# Patient Record
Sex: MALE | Race: WHITE | NOT HISPANIC OR LATINO | Employment: FULL TIME | URBAN - METROPOLITAN AREA
[De-identification: names, ages, dates, MRNs, and addresses within clinical notes are randomized per-mention and may not be internally consistent; named-entity substitution may affect disease eponyms.]

---

## 2021-05-20 ENCOUNTER — ANESTHESIA - HEALTHEAST (OUTPATIENT)
Dept: SURGERY | Facility: HOSPITAL | Age: 46
End: 2021-05-20

## 2021-05-20 ENCOUNTER — RECORDS - HEALTHEAST (OUTPATIENT)
Dept: ADMINISTRATIVE | Facility: OTHER | Age: 46
End: 2021-05-20

## 2021-05-20 ENCOUNTER — COMMUNICATION - HEALTHEAST (OUTPATIENT)
Dept: SCHEDULING | Facility: CLINIC | Age: 46
End: 2021-05-20

## 2021-05-20 ENCOUNTER — HOSPITAL ENCOUNTER (EMERGENCY)
Dept: SURGERY | Facility: HOSPITAL | Age: 46
Discharge: HOME | End: 2021-05-20
Attending: FAMILY MEDICINE | Admitting: SPECIALIST

## 2021-05-20 ENCOUNTER — SURGERY - HEALTHEAST (OUTPATIENT)
Dept: SURGERY | Facility: HOSPITAL | Age: 46
End: 2021-05-20

## 2021-05-20 DIAGNOSIS — K35.30 ACUTE APPENDICITIS WITH LOCALIZED PERITONITIS, WITHOUT PERFORATION, ABSCESS, OR GANGRENE: ICD-10-CM

## 2021-05-20 LAB
ALBUMIN SERPL-MCNC: 4.3 G/DL (ref 3.5–5)
ALP SERPL-CCNC: 101 U/L (ref 45–120)
ALT SERPL W P-5'-P-CCNC: 20 U/L (ref 0–45)
ANION GAP SERPL CALCULATED.3IONS-SCNC: 8 MMOL/L (ref 5–18)
AST SERPL W P-5'-P-CCNC: 20 U/L (ref 0–40)
BASOPHILS # BLD AUTO: 0.1 THOU/UL (ref 0–0.2)
BASOPHILS NFR BLD AUTO: 1 % (ref 0–2)
BILIRUB DIRECT SERPL-MCNC: 0.4 MG/DL
BILIRUB SERPL-MCNC: 1.2 MG/DL (ref 0–1)
BUN SERPL-MCNC: 17 MG/DL (ref 8–22)
CALCIUM SERPL-MCNC: 8.7 MG/DL (ref 8.5–10.5)
CHLORIDE BLD-SCNC: 105 MMOL/L (ref 98–107)
CO2 SERPL-SCNC: 27 MMOL/L (ref 22–31)
CREAT SERPL-MCNC: 1.45 MG/DL (ref 0.7–1.3)
EOSINOPHIL # BLD AUTO: 0.2 THOU/UL (ref 0–0.4)
EOSINOPHIL NFR BLD AUTO: 2 % (ref 0–6)
ERYTHROCYTE [DISTWIDTH] IN BLOOD BY AUTOMATED COUNT: 11.7 % (ref 11–14.5)
GFR SERPL CREATININE-BSD FRML MDRD: 53 ML/MIN/1.73M2
GLUCOSE BLD-MCNC: 116 MG/DL (ref 70–125)
HCT VFR BLD AUTO: 45.1 % (ref 40–54)
HGB BLD-MCNC: 15.2 G/DL (ref 14–18)
IMM GRANULOCYTES # BLD: 0 THOU/UL
IMM GRANULOCYTES NFR BLD: 0 %
LIPASE SERPL-CCNC: 25 U/L (ref 0–52)
LYMPHOCYTES # BLD AUTO: 1.7 THOU/UL (ref 0.8–4.4)
LYMPHOCYTES NFR BLD AUTO: 19 % (ref 20–40)
MCH RBC QN AUTO: 30.7 PG (ref 27–34)
MCHC RBC AUTO-ENTMCNC: 33.7 G/DL (ref 32–36)
MCV RBC AUTO: 91 FL (ref 80–100)
MONOCYTES # BLD AUTO: 0.8 THOU/UL (ref 0–0.9)
MONOCYTES NFR BLD AUTO: 9 % (ref 2–10)
NEUTROPHILS # BLD AUTO: 6.1 THOU/UL (ref 2–7.7)
NEUTROPHILS NFR BLD AUTO: 69 % (ref 50–70)
PLATELET # BLD AUTO: 251 THOU/UL (ref 140–440)
PMV BLD AUTO: 8.9 FL (ref 8.5–12.5)
POTASSIUM BLD-SCNC: 4 MMOL/L (ref 3.5–5)
PROT SERPL-MCNC: 7.6 G/DL (ref 6–8)
RBC # BLD AUTO: 4.95 MILL/UL (ref 4.4–6.2)
SARS-COV-2 PCR RESULT-HE - HISTORICAL: NEGATIVE
SODIUM SERPL-SCNC: 140 MMOL/L (ref 136–145)
WBC: 8.8 THOU/UL (ref 4–11)

## 2021-05-20 ASSESSMENT — MIFFLIN-ST. JEOR
SCORE: 2012.53
SCORE: 2017.53

## 2021-05-21 LAB
LAB AP CHARGES (HE HISTORICAL CONVERSION): NORMAL
PATH REPORT.COMMENTS IMP SPEC: NORMAL
PATH REPORT.FINAL DX SPEC: NORMAL
PATH REPORT.GROSS SPEC: NORMAL
PATH REPORT.MICROSCOPIC SPEC OTHER STN: NORMAL
PATH REPORT.RELEVANT HX SPEC: NORMAL
RESULT FLAG (HE HISTORICAL CONVERSION): NORMAL

## 2021-05-27 VITALS
BODY MASS INDEX: 27.67 KG/M2 | HEIGHT: 76 IN | WEIGHT: 227.3 LBS | HEIGHT: 76 IN | WEIGHT: 227.3 LBS | BODY MASS INDEX: 27.68 KG/M2

## 2021-05-31 ENCOUNTER — RECORDS - HEALTHEAST (OUTPATIENT)
Dept: ADMINISTRATIVE | Facility: CLINIC | Age: 46
End: 2021-05-31

## 2021-06-01 ENCOUNTER — RECORDS - HEALTHEAST (OUTPATIENT)
Dept: ADMINISTRATIVE | Facility: CLINIC | Age: 46
End: 2021-06-01

## 2021-06-17 NOTE — ED PROVIDER NOTES
EMERGENCY DEPARTMENT ENCOUNTER      NAME: Rene Rowe Jr.  AGE: 45 y.o. male  YOB: 1975  MRN: [id number]  EVALUATION DATE & TIME: 2021  2:14 AM    ED PROVIDER: Jose Zamorano M.D.    FINAL IMPRESSION:  1. Acute appendicitis with localized peritonitis, without perforation, abscess, or gangrene        ED COURSE & MEDICAL DECISION MAKIN:50 AM Patient seen and examined. Prior history and records reviewed.  I was wearing PPE including gloves and N95 mask.  Differential diagnosis includes but not limited to gastritis, cholecystitis, pancreatitis, colitis, enteritis, diverticulitis, urinary tract infection, myocardial infarction, pneumonia appendicitis, AAA, cholelithiasis, ischemic bowel.  Patient presents with right lower quadrant pain.  Labs done prior to evaluation are normal, CT scan done prior to evaluation demonstrates acute appendicitis.  Discussed plan with patient, Zosyn ordered and likely surgery in the morning.  5:04 AM care discussed with Dr. Oliva, plan for surgery today.  6:00 AM Signout to Dr. Steele.    At the conclusion of the encounter I discussed the results of all of the tests and the disposition. The questions were answered. The patient or family acknowledged understanding and was agreeable with the care plan.       MEDICATIONS GIVEN IN THE EMERGENCY:  Medications   sodium chloride 0.9% 1,000 mL (1,000 mL Intravenous New Bag 21 0309)   iopamidol solution 75 mL (ISOVUE-370) (75 mL Intravenous Given 21 0235)   piperacillin-tazobactam 3.375 g in NaCl 0.9 % 50 mL (MINI-BAG Plus) (ZOSYN) (0 g Intravenous Stopped 21 0341)       NEW PRESCRIPTIONS STARTED AT TODAY'S ER VISIT  There are no discharge medications for this patient.       PCP: Tyler Sidhu MD  =================================================================    Chief Complaint   Patient presents with     Abdominal Pain     Nausea       HPI      Rene Rowe Jr. is a 45 y.o. male  "with a PMH of chronic renal insufficiency (stage 2) and kidney donor who presents to this ED for evaluation of abdominal pain and nausea.    Patient reports \"a lot of pain\" in the right lower abdomen and nausea. He states the pain is worse with movement, but somewhat improves if he finds a comfortable spot and remains still. Patient reports his pain began Tuesday (05/18) and was just above his belly button, but yesterday (05/19) the pain moved to the right lower abdomen. Patient additionally reports some constipation and states he has only had 1 bowel movement in the pat 2 days. He also states that he has been urinating without difficulty, but occasionally is unsure if he was emptied his bladder fully. Patient's prior surgeries include when he donated his kidney, vasectomy, and hernia repair. Of note, the patient last drank at 1845 and last ate at 2100 last night. Patient denies any other symptoms or concerns at this time.     REVIEW OF SYSTEMS   Review of Systems   Constitutional: Negative for chills and fever.   HENT: Negative for congestion and sore throat.    Respiratory: Negative for cough and shortness of breath.    Cardiovascular: Negative for chest pain.   Gastrointestinal: Positive for abdominal pain (RLQ), constipation and nausea. Negative for diarrhea and vomiting.   Genitourinary: Negative for difficulty urinating, dysuria, frequency and hematuria.    See HPI.  All other systems reviewed and negative.    PAST MEDICAL HISTORY:  History reviewed. No pertinent past medical history.    PAST SURGICAL HISTORY:  Relevant past surgical history reviewed with patient, unless otherwise stated in HPI, history not pertinent to this visit.    CURRENT MEDICATIONS:    No current facility-administered medications on file prior to encounter.      No current outpatient medications on file prior to encounter.       ALLERGIES:  No Known Allergies    FAMILY HISTORY:  History reviewed. No pertinent family history.    SOCIAL " "HISTORY:   Social History     Socioeconomic History     Marital status:      Spouse name: None     Number of children: None     Years of education: None     Highest education level: None   Occupational History     None   Social Needs     Financial resource strain: None     Food insecurity     Worry: None     Inability: None     Transportation needs     Medical: None     Non-medical: None   Tobacco Use     Smoking status: None   Substance and Sexual Activity     Alcohol use: None     Drug use: None     Sexual activity: None   Lifestyle     Physical activity     Days per week: None     Minutes per session: None     Stress: None   Relationships     Social connections     Talks on phone: None     Gets together: None     Attends Congregation service: None     Active member of club or organization: None     Attends meetings of clubs or organizations: None     Relationship status: None     Intimate partner violence     Fear of current or ex partner: None     Emotionally abused: None     Physically abused: None     Forced sexual activity: None   Other Topics Concern     None   Social History Narrative     None       VITALS:  Patient Vitals for the past 24 hrs:   BP Temp Pulse Resp SpO2 Height Weight   05/20/21 0126 112/80 97.8  F (36.6  C) 70 16 97 % 6' 4\" (1.93 m) (!) 227 lb 4.8 oz (103.1 kg)       PHYSICAL EXAM    Physical Exam   Constitutional: He is oriented to person, place, and time. He appears well-developed and well-nourished.   HENT:   Head: Normocephalic and atraumatic.   Nose: Nose normal.   Mouth/Throat: Oropharynx is clear and moist.   Eyes: Pupils are equal, round, and reactive to light. Conjunctivae and EOM are normal.   Neck: Normal range of motion. Neck supple.   Cardiovascular: Normal rate, regular rhythm and normal heart sounds.   Pulmonary/Chest: Effort normal and breath sounds normal.   Abdominal: Soft. Bowel sounds are normal. There is abdominal tenderness (RLQ). There is no rebound and no " guarding.   Musculoskeletal: Normal range of motion.   Lymphadenopathy:     He has no cervical adenopathy.   Neurological: He is alert and oriented to person, place, and time. Coordination normal.   No gross focal neurologic deficits.  Cranial nerves III-XII intact.   Skin: Skin is warm and dry.   Psychiatric: He has a normal mood and affect. His behavior is normal.   Nursing note and vitals reviewed.       LAB:  All pertinent labs reviewed and interpreted.  Results for orders placed or performed during the hospital encounter of 05/20/21   Basic Metabolic Panel   Result Value Ref Range    Sodium 140 136 - 145 mmol/L    Potassium 4.0 3.5 - 5.0 mmol/L    Chloride 105 98 - 107 mmol/L    CO2 27 22 - 31 mmol/L    Anion Gap, Calculation 8 5 - 18 mmol/L    Glucose 116 70 - 125 mg/dL    Calcium 8.7 8.5 - 10.5 mg/dL    BUN 17 8 - 22 mg/dL    Creatinine 1.45 (H) 0.70 - 1.30 mg/dL    GFR MDRD Af Amer >60 >60 mL/min/1.73m2    GFR MDRD Non Af Amer 53 (L) >60 mL/min/1.73m2   Hepatic Profile   Result Value Ref Range    Bilirubin, Total 1.2 (H) 0.0 - 1.0 mg/dL    Bilirubin, Direct 0.4 <=0.5 mg/dL    Protein, Total 7.6 6.0 - 8.0 g/dL    Albumin 4.3 3.5 - 5.0 g/dL    Alkaline Phosphatase 101 45 - 120 U/L    AST 20 0 - 40 U/L    ALT 20 0 - 45 U/L   Lipase   Result Value Ref Range    Lipase 25 0 - 52 U/L   HM1 (CBC with Diff)   Result Value Ref Range    WBC 8.8 4.0 - 11.0 thou/uL    RBC 4.95 4.40 - 6.20 mill/uL    Hemoglobin 15.2 14.0 - 18.0 g/dL    Hematocrit 45.1 40.0 - 54.0 %    MCV 91 80 - 100 fL    MCH 30.7 27.0 - 34.0 pg    MCHC 33.7 32.0 - 36.0 g/dL    RDW 11.7 11.0 - 14.5 %    Platelets 251 140 - 440 thou/uL    MPV 8.9 8.5 - 12.5 fL    Neutrophils % 69 50 - 70 %    Lymphocytes % 19 (L) 20 - 40 %    Monocytes % 9 2 - 10 %    Eosinophils % 2 0 - 6 %    Basophils % 1 0 - 2 %    Immature Granulocyte % 0 <=0 %    Neutrophils Absolute 6.1 2.0 - 7.7 thou/uL    Lymphocytes Absolute 1.7 0.8 - 4.4 thou/uL    Monocytes Absolute 0.8 0.0 -  0.9 thou/uL    Eosinophils Absolute 0.2 0.0 - 0.4 thou/uL    Basophils Absolute 0.1 0.0 - 0.2 thou/uL    Immature Granulocyte Absolute 0.0 <=0.0 thou/uL   Asymptomatic SARS-CoV-2 (COVID-19)-PCR    Specimen: Respiratory   Result Value Ref Range    SARS-CoV-2 PCR Result Negative Negative, Invalid       RADIOLOGY:  Reviewed all pertinent imaging. Please see official radiology report.  Ct Abdomen Pelvis Without Oral With Iv Contrast    Result Date: 5/20/2021  EXAM: CT ABDOMEN PELVIS WO ORAL W IV CONTRAST LOCATION: [facility] DATE/TIME: 5/20/2021 2:41 AM INDICATION: Right sided abdominal pain. COMPARISON: None. TECHNIQUE: CT scan of the abdomen and pelvis was performed following injection of IV contrast. Multiplanar reformats were obtained. Dose reduction techniques were used. CONTRAST: Iopamidol (Isovue-370) 75 mL. FINDINGS: LOWER CHEST: Normal. HEPATOBILIARY: Normal. PANCREAS: Normal. SPLEEN: Normal. ADRENAL GLANDS: Normal. KIDNEYS/BLADDER: Status post left nephrectomy. 3.3 x 3 cm benign-appearing right ovarian cyst, no follow-up required. No obstructing renal or ureteral stones. No hydroureteronephrosis. BOWEL: 1.4 cm dilated distended appendix with adjacent inflammatory change and mural thickening of the cecum at the appendiceal origin consistent with appendicitis. Small volume free fluid. No organized fluid collection. Scattered diverticulosis. LYMPH NODES: Normal. VASCULATURE: Unremarkable. PELVIC ORGANS: Normal. MUSCULOSKELETAL: Normal.     1.  Acute appendicitis. Small volume free fluid. No organized fluid collection. 2.  Status post left nephrectomy.    I, Natasha Polanco, am serving as a scribe to document services personally performed by Dr. Jose Zamorano based on my observation and the provider's statements to me. I, Jose Zamorano MD attest that Natasha Polanco is acting in a scribe capacity, has observed my performance of the services and has documented them in  accordance with my direction.    Jose Zamorano M.D.  Emergency Medicine  [facility] EMERGENCY DEPARTMENT  [address]  [facility] [address]  Dept: [phone]  Loc: [phone]       Jose Zamorano MD  05/20/21 0538

## 2021-06-17 NOTE — PROGRESS NOTES
Pharmacy Note - Admission Medication History  Pertinent Provider Information:    ______________________________________________________________________  Prior To Admission (PTA) med list completed and updated in EMR.     No outpatient medications have been marked as taking for the 5/20/21 encounter (Hospital Encounter).       Information source(s): [facility] and CareEverywhere[facility]  Method of interview communication: in-person  Patient was asked about OTC/herbal products specifically.  PTA med list reflects this.  Based on the pharmacist s assessment, the PTA med list information appears reliable  Allergies were reviewed, assessed, and updated with the patient.    Patient does not use any multi-dose medications prior to admission.   Thank you for the opportunity to participate in the care of this patient.    Mary Tapia, PharmD     5/20/2021     7:32 AM

## 2021-06-17 NOTE — CONSULTS
Consultation - Surgery  Rene Mclean Jr.,  1975, MRN [id number]    Admitting Dx: Acute appendicitis with localized peritonitis, without perforation, abscess, or gangrene [K35.30]    PCP: Tyler Sidhu MD, [phone]   Code status:  No Order       Extended Emergency Contact Information  Primary Emergency Contact: ARLENE MCLEAN  Home Phone: [phone]  Mobile Phone: [phone]  Relation: Spouse       Assessment and Plan     Principal Problem:    Acute appendicitis with localized peritonitis, without perforation, abscess, or gangrene  N.p.o.  IV antibiotics  OR for laparoscopic appendectomy     Chief Complaint Acute appendicitis with localized peritonitis, without perforation, abscess, or gangrene       HPI    We have been requested by emergency room to evaluate Rene Mclean Jr. for abdominal pain who is a 45 y.o. year old male for appendicitis.  Patient presents with abdominal pain going on for a few days time.  Got really bad yesterday count of mid abdomen pain but now localized in the right lower abdomen.  Was worked up in the emergency room and found to have appendicitis.  Admitted IV antibiotics and for appendectomy.  Otherwise normal state of health       Medical History  There are no active non-hospital problems to display for this patient.    History reviewed. No pertinent past medical history. Surgical History  He  has a past surgical history that includes Nephrectomy living donor (Left).   Social History  Reviewed, and he  reports that he has never smoked. He has never used smokeless tobacco. He reports current alcohol use. He reports that he does not use drugs.   Allergies  No Known Allergies Family History  Reviewed, and family history is not on file.   Psychosocial Needs  Social History     Social History Narrative     Not on file     Additional psychosocial needs reviewed per nursing assessment.     Prior to Admission Medications   No medications prior to admission.           Review of Systems:  Pertinent items are noted in HPI.  A 12 point comprehensive review of systems was negative except as noted. Physical Exam:  Temp:  [97.8  F (36.6  C)-98.7  F (37.1  C)] 98.7  F (37.1  C)  Heart Rate:  [49-70] 56  Resp:  [16-18] 18  BP: (104-119)/(59-80) 108/72    General appearance: alert, appears stated age and cooperative  Head: Normocephalic, without obvious abnormality, atraumatic  Eyes: conjunctivae/corneas clear. PERRL, EOM's intact. Fundi benign.  Nose: Nares normal. Septum midline. Mucosa normal. No drainage or sinus tenderness.  Neck: supple, symmetrical, trachea midline  Lungs: clear to auscultation bilaterally  Heart: regular rate and rhythm, S1, S2 normal, no murmur, click, rub or gallop  Abdomen: Soft tender right lower quadrant no rebound  Extremities: extremities normal, atraumatic, no cyanosis or edema  Pulses: 2+ and symmetric  Neurologic: Grossly normal       Pertinent Labs  Lab Results: personally reviewed.   Recent Results (from the past 24 hour(s))   Basic Metabolic Panel   Result Value Ref Range    Sodium 140 136 - 145 mmol/L    Potassium 4.0 3.5 - 5.0 mmol/L    Chloride 105 98 - 107 mmol/L    CO2 27 22 - 31 mmol/L    Anion Gap, Calculation 8 5 - 18 mmol/L    Glucose 116 70 - 125 mg/dL    Calcium 8.7 8.5 - 10.5 mg/dL    BUN 17 8 - 22 mg/dL    Creatinine 1.45 (H) 0.70 - 1.30 mg/dL    GFR MDRD Af Amer >60 >60 mL/min/1.73m2    GFR MDRD Non Af Amer 53 (L) >60 mL/min/1.73m2   Hepatic Profile   Result Value Ref Range    Bilirubin, Total 1.2 (H) 0.0 - 1.0 mg/dL    Bilirubin, Direct 0.4 <=0.5 mg/dL    Protein, Total 7.6 6.0 - 8.0 g/dL    Albumin 4.3 3.5 - 5.0 g/dL    Alkaline Phosphatase 101 45 - 120 U/L    AST 20 0 - 40 U/L    ALT 20 0 - 45 U/L   Lipase   Result Value Ref Range    Lipase 25 0 - 52 U/L   HM1 (CBC with Diff)   Result Value Ref Range    WBC 8.8 4.0 - 11.0 thou/uL    RBC 4.95 4.40 - 6.20 mill/uL    Hemoglobin 15.2 14.0 - 18.0 g/dL    Hematocrit 45.1 40.0 -  54.0 %    MCV 91 80 - 100 fL    MCH 30.7 27.0 - 34.0 pg    MCHC 33.7 32.0 - 36.0 g/dL    RDW 11.7 11.0 - 14.5 %    Platelets 251 140 - 440 thou/uL    MPV 8.9 8.5 - 12.5 fL    Neutrophils % 69 50 - 70 %    Lymphocytes % 19 (L) 20 - 40 %    Monocytes % 9 2 - 10 %    Eosinophils % 2 0 - 6 %    Basophils % 1 0 - 2 %    Immature Granulocyte % 0 <=0 %    Neutrophils Absolute 6.1 2.0 - 7.7 thou/uL    Lymphocytes Absolute 1.7 0.8 - 4.4 thou/uL    Monocytes Absolute 0.8 0.0 - 0.9 thou/uL    Eosinophils Absolute 0.2 0.0 - 0.4 thou/uL    Basophils Absolute 0.1 0.0 - 0.2 thou/uL    Immature Granulocyte Absolute 0.0 <=0.0 thou/uL   Asymptomatic SARS-CoV-2 (COVID-19)-PCR    Specimen: Respiratory   Result Value Ref Range    SARS-CoV-2 PCR Result Negative Negative, Invalid           Pertinent Radiology  Radiology Results: Personally reviewed image/s     CT Abdomen Pelvis Without Oral With IV Contrast [[id number]] Collected: 05/20/21 0241   Order Status: Completed Updated: 05/20/21 0300   Narrative:     EXAM: CT ABDOMEN PELVIS WO ORAL W IV CONTRAST   LOCATION: [facility]   DATE/TIME: 5/20/2021 2:41 AM     INDICATION: Right sided abdominal pain.   COMPARISON: None.   TECHNIQUE: CT scan of the abdomen and pelvis was performed following injection of IV contrast. Multiplanar reformats were obtained. Dose reduction techniques were used.   CONTRAST: Iopamidol (Isovue-370) 75 mL.     FINDINGS:   LOWER CHEST: Normal.     HEPATOBILIARY: Normal.     PANCREAS: Normal.     SPLEEN: Normal.     ADRENAL GLANDS: Normal.     KIDNEYS/BLADDER: Status post left nephrectomy. 3.3 x 3 cm benign-appearing right ovarian cyst, no follow-up required. No obstructing renal or ureteral stones. No hydroureteronephrosis.     BOWEL: 1.4 cm dilated distended appendix with adjacent inflammatory change and mural thickening of the cecum at the appendiceal origin consistent with appendicitis. Small volume free fluid. No organized fluid  collection. Scattered diverticulosis.     LYMPH NODES: Normal.     VASCULATURE: Unremarkable.     PELVIC ORGANS: Normal.     MUSCULOSKELETAL: Normal.    Impression:     1.  Acute appendicitis. Small volume free fluid. No organized fluid collection.   2.  Status post left nephrectomy.

## 2021-06-17 NOTE — ANESTHESIA PREPROCEDURE EVALUATION
Anesthesia Evaluation      Patient summary reviewed   No history of anesthetic complications     Airway   Mallampati: I  Neck ROM: full   Pulmonary - negative ROS and normal exam                          Cardiovascular - negative ROS and normal exam   Neuro/Psych - negative ROS     Endo/Other - negative ROS      GI/Hepatic/Renal    (+)   chronic renal disease (One kidney) CRI,           Dental - normal exam                        Anesthesia Plan  Planned anesthetic: general endotracheal  50 mg ketamine IV on induction.  Avoid nephrotoxic medications.  ASA 2   Induction: intravenous   Anesthetic plan and risks discussed with: patient  Anesthesia plan special considerations: antiemetics,   Post-op plan: routine recovery

## 2021-06-17 NOTE — OP NOTE
Operative Note    Name:  Rene Rowe   PCP:  Tyler Sidhu MD  Procedure Date:  5/20/2021      APPENDECTOMY, LAPAROSCOPIC    Pre-Procedure Diagnosis:  Acute appendicitis with localized peritonitis, without perforation, abscess, or gangrene [K35.30]     Post-Procedure Diagnosis:    same    Surgeon(s):  Gerry Mccurdy MD    Circulator: Joshua Fleming RN  Relief Circulator: Meryl Cortez RN  Scrub Person: Sadie Capps Tech    Anesthesia Type:  General    History reviewed. No pertinent past medical history.    Patient Active Problem List    Diagnosis Date Noted     Acute appendicitis with localized peritonitis, without perforation, abscess, or gangrene 05/20/2021       Findings:  Very inflamed appendix    Operative Report:  Consent was obtained.  The patient was taken to the operating room and placed in supine position and general anesthesia was administered by anesthesia staff.  A briefing was performed. A Fraga was placed. The patient was prepped and draped in a sterile manner.  A timeout was performed and the OR staff.  An incision was made at the umbilicus.  The Visiport was used to gain access to the peritoneal cavity.  It was insufflated to a pressure of 15 mm of CO2.  Under direct visualization a 5 mm port was placed above the pubic tubercle and a 5 mm port was placed in the right lower quadrant.  The appendix was identified and freed from surrounding adhesions.  The mesial appendix was taken down with harmonic scapel.  The need for a stapler in this case so I changed my lower port to a 11 mm port. A 45 mm endoscopic LIMA stapler was used to ligate the appendix at the base.  The appendix was placed in an Endo Catch bag and brought through the lower 11mm trocar.  The operative area was irrigated out with normal saline and antibiotic solution.  This was removed.  20 mL of Marcaine was left in the operative bed.  All trochars and CO2 were removed and evacuated.  The lower 11 mm port was  closed with a endofascial closure device and a 0 vicryl suture.. The incisions were closed with a 4-0 Monocryl suture in a subcuticular fashion.  10 mL of Marcaine were injected in the incisions Steri-Strips and sterile dressings were applied.  The Fraga was removed the patient was extubated and transferred to PACU in stable condition.  All sponge and needle counts were correct.      Estimated Blood Loss:   10 mL from 5/20/2021  9:25 AM to 5/20/2021 10:42 AM    Specimens:    appendix       Drains:   none    Complications:    None    Gerry GARDUNO [facility]    Date: 5/20/2021  Time: 10:51 AM

## 2021-06-17 NOTE — ANESTHESIA POSTPROCEDURE EVALUATION
Patient: Rene Rowe Jr.  Procedure(s):  APPENDECTOMY, LAPAROSCOPIC  Anesthesia type: general    Patient location: PACU  Last vitals:   Vitals Value Taken Time   /76 05/20/21 1100   Temp 36.9  C (98.5  F) 05/20/21 1045   Pulse 53 05/20/21 1113   Resp 18 05/20/21 1113   SpO2 94 % 05/20/21 1113   Vitals shown include unvalidated device data.  Post vital signs: stable  Level of consciousness: awake and responds to simple questions  Post-anesthesia pain: pain controlled  Post-anesthesia nausea and vomiting: no  Pulmonary: unassisted, return to baseline  Cardiovascular: stable and blood pressure at baseline  Hydration: adequate  Anesthetic events: no    QCDR Measures:  ASA# 11 - Lilo-op Cardiac Arrest: ASA11B - Patient did NOT experience unanticipated cardiac arrest  ASA# 12 - Lilo-op Mortality Rate: ASA12B - Patient did NOT die  ASA# 13 - PACU Re-Intubation Rate: ASA13B - Patient did NOT require a new airway mgmt  ASA# 10 - Composite Anes Safety: ASA10A - No serious adverse event    Additional Notes:

## 2021-09-04 ENCOUNTER — HEALTH MAINTENANCE LETTER (OUTPATIENT)
Age: 46
End: 2021-09-04

## 2022-01-12 VITALS — WEIGHT: 227.3 LBS | BODY MASS INDEX: 27.68 KG/M2 | HEIGHT: 76 IN

## 2022-10-22 ENCOUNTER — HEALTH MAINTENANCE LETTER (OUTPATIENT)
Age: 47
End: 2022-10-22

## 2022-11-09 ENCOUNTER — HOSPITAL ENCOUNTER (EMERGENCY)
Facility: HOSPITAL | Age: 47
Discharge: HOME | End: 2022-11-09
Attending: EMERGENCY MEDICINE | Admitting: EMERGENCY MEDICINE
Payer: COMMERCIAL

## 2022-11-09 ENCOUNTER — APPOINTMENT (OUTPATIENT)
Dept: ULTRASOUND IMAGING | Facility: HOSPITAL | Age: 47
End: 2022-11-09
Attending: EMERGENCY MEDICINE
Payer: COMMERCIAL

## 2022-11-09 VITALS
DIASTOLIC BLOOD PRESSURE: 75 MMHG | TEMPERATURE: 98.2 F | BODY MASS INDEX: 28 KG/M2 | HEART RATE: 80 BPM | WEIGHT: 230 LBS | RESPIRATION RATE: 14 BRPM | SYSTOLIC BLOOD PRESSURE: 122 MMHG | OXYGEN SATURATION: 100 %

## 2022-11-09 DIAGNOSIS — I82.461 ACUTE DEEP VEIN THROMBOSIS (DVT) OF CALF MUSCLE VEIN OF RIGHT LOWER EXTREMITY (H): ICD-10-CM

## 2022-11-09 PROCEDURE — 250N000013 HC RX MED GY IP 250 OP 250 PS 637: Performed by: EMERGENCY MEDICINE

## 2022-11-09 PROCEDURE — 99284 EMERGENCY DEPT VISIT MOD MDM: CPT | Mod: 25

## 2022-11-09 PROCEDURE — 93971 EXTREMITY STUDY: CPT | Mod: RT

## 2022-11-09 RX ORDER — APIXABAN 5 MG (74)
KIT ORAL
Qty: 1 EACH | Refills: 0 | Status: SHIPPED | OUTPATIENT
Start: 2022-11-09 | End: 2022-12-09

## 2022-11-09 RX ADMIN — APIXABAN 10 MG: 5 TABLET, FILM COATED ORAL at 21:32

## 2022-11-09 ASSESSMENT — ACTIVITIES OF DAILY LIVING (ADL): ADLS_ACUITY_SCORE: 35

## 2022-11-10 NOTE — ED TRIAGE NOTES
"Patient arrives by private car for evaluation of right leg pain.  Patient reports he had cysts removed on this leg on Friday 11/4/22.  States he did not start taking his ASA as directed until today.  Has calf pain and leg feels \"tingly\".        "

## 2022-11-10 NOTE — ED PROVIDER NOTES
EMERGENCY DEPARTMENT ENCOUNTER      NAME: Rene Rowe Jr.  AGE: 47 year old male  YOB: 1975  MRN: [id number]  EVALUATION DATE & TIME: 11/9/2022  7:22 PM    PCP: Tyler Sidhu    ED PROVIDER: José Miguel Harrington M.D.      Chief Complaint   Patient presents with     Leg Pain     Post-op Problem         FINAL IMPRESSION:  Right calf DVT      ED COURSE & MEDICAL DECISION MAKING:    Pertinent Labs & Imaging studies reviewed. (See chart for details)  47 year old male presents to the Emergency Department for evaluation of left leg swelling, tingling.  Patient with recent ganglion cyst removal and neurolysis of the right peroneal nerve.  Over the last few days patient with increased discomfort and achiness in the proximal right gastrocnemius.  Also feels he has slight swelling the area.  Examination reveals incision to be well approximated without spreading erythema to suggest infection.  Slight tenderness along the calf.  Ultrasound obtained prior to evaluation.  Concern is a possible DVT.  No shortness of breath or other symptoms suggest need for laboratory evaluation or CTA.. Patient appears non toxic with stable vitals signs. Overall exam is benign.          7:53 PM I met with the patient for the initial interview and physical examination. Discussed plan for treatment and workup in the ED.    8:55 PM.  Ultrasound with proximal right calf DVT.  Patient initiated on Eliquis.  Patient cautioned to monitor his wound carefully for any signs of bleeding or swelling which would suggest deeper bleeding.  Understood this well.  At the conclusion of the encounter I discussed the results of all of the tests and the disposition. The questions were answered and return precautions provided. The patient or family acknowledged understanding and was agreeable with the care plan.       PPE: Provider wore gloves, N95 mask    MEDICATIONS GIVEN IN THE EMERGENCY:  Medications - No data to display    NEW PRESCRIPTIONS  "STARTED AT TODAY'S ER VISIT  Current Discharge Medication List      START taking these medications    Details   Apixaban Starter Pack (ELIQUIS DVT/PE STARTER PACK) 5 MG TBPK Take 10 mg by mouth 2 times daily for 7 days, THEN 5 mg 2 times daily for 23 days.  Qty: 1 each, Refills: 0                =================================================================    HPI    Patient information was obtained from: Patient     Use of Intrepreter: N/A       Rene Rowe Jr. is a 47 year old male with a pertient medical history of right knee osteroarthritis who presents to the ED for evaluation of leg pain following a surgery     Per chart review: The patient had soft tissue masses removed from their right lower extremity [facility]     The patient reports they had two ganglion cysts removed from their right lower extremity (see chart review) and slept without elevating their leg for the first time 2 days ago. Since then, the patient has noticed right calf pain with walking and more swelling than normal. They also state that it feels \"tight\".     REVIEW OF SYSTEMS   Constitutional:  Denies fever, chills  Respiratory:  Denies productive cough or increased work of breathing  Cardiovascular:  Denies chest pain, palpitations  GI:  Denies abdominal pain, nausea, vomiting, or change in bowel or bladder habits   Musculoskeletal:  Positive for right calf pain and swelling   Skin:  Denies rash. Positive for surgical wound on right leg   Neurologic:  Denies focal weakness  All systems negative except as marked.     PAST MEDICAL HISTORY:  History reviewed. No pertinent past medical history.    PAST SURGICAL HISTORY:  Past Surgical History:   Procedure Laterality Date     NEPHRECTOMY LIVING DONOR Left      WI LAP,APPENDECTOMY N/A 5/20/2021    Procedure: APPENDECTOMY, LAPAROSCOPIC;  Surgeon: Gerry Mccurdy MD;  Location: [facility] [place];  Service: General         CURRENT MEDICATIONS:    No " current facility-administered medications for this encounter.    Current Outpatient Medications:      DOXYCYCLINE HYCLATE PO, Take  by mouth 2 times daily., Disp: , Rfl:     ALLERGIES:  No Known Allergies    FAMILY HISTORY:  History reviewed. No pertinent family history.    SOCIAL HISTORY:   Social History     Socioeconomic History     Marital status:      Spouse name: None     Number of children: None     Years of education: None     Highest education level: None   Tobacco Use     Smoking status: Never     Smokeless tobacco: Never   Substance and Sexual Activity     Alcohol use: Yes     Comment: Alcoholic Drinks/day: occasionally     Drug use: Never       VITALS:  Patient Vitals for the past 24 hrs:   BP Temp Temp src Pulse Resp SpO2 Weight   11/09/22 2028 122/75 -- -- 80 14 100 % --   11/09/22 1821 116/76 98.2  F (36.8  C) Tympanic 76 22 96 % 104.3 kg (230 lb)        PHYSICAL EXAM    Constitutional:  Awake, alert, in no apparent distress  HENT:  Normocephalic, Atraumatic. Bilateral external ears normal. Oropharynx moist. Nose normal. Neck- Normal range of motion   Eyes:  PERRL, EOMI with no signs of entrapment, Conjunctiva normal, No discharge.   Musculoskeletal:  Intact distal pulses, No edema. Good range of motion in all major joints. No tenderness to palpation or major deformities noted.  Integument:  Warm, Dry, No erythema, No rash. Surgical incision is well approximated with no discharge. Large incision over right proximal lateral calf, slight tenderness in right proximal lateral belly of gastrocnemius muscle    Neurologic:  Alert & oriented, Normal motor function, Normal sensory function, No focal deficits noted.   Psychiatric:  Affect normal, Judgment normal, Mood normal.         RADIOLOGY:  Reviewed all pertinent imaging. Please see official radiology report.  US Lower Extremity Venous Duplex Right   Final Result   IMPRESSION:   1.  Small amount of thrombus is present within the posterior tibial  veins of the right calf.                  I, Melanei Fernandez, am serving as a scribe to document services personally performed by José Miguel Harrington MD, based on my observation and the provider's statements to me. I, José Miguel Harrington MD attest that Melanie Fernandez is acting in a scribe capacity, has observed my performance of the services and has documented them in accordance with my direction.    José Miguel Harrington M.D.  Emergency Medicine  [facility] EMERGENCY DEPARTMENT     José Miguel Harrington MD  11/09/22 0511

## 2022-11-10 NOTE — ED NOTES
ED Provider In Triage Note  [facility]  Encounter Date: Nov 9, 2022    Chief Complaint   Patient presents with     Leg Pain     Post-op Problem       Brief HPI:   Rene Rowe Jr. is a 47 year old male presenting to the Emergency Department with a chief complaint of right lower leg pain and numbness.  Patient had two cysts removed a few days ago that were causing foot drop due to the fact that they were pressing on the peroneal nerve.  Lower leg also seems more swollen. Removing the ace wrap did not help relieve the numbness.  Patient is concerned about a blood clot.  Patient has not been taking the recommended Asprin since his surgery.      Brief Physical Exam:  /76   Pulse 76   Temp 98.2  F (36.8  C) (Tympanic)   Resp 22   Wt 104.3 kg (230 lb)   SpO2 96%   BMI 28.00 kg/m    General: Non-toxic appearing  HEENT: Atraumatic  Resp: No respiratory distress  Abdomen: Non-peritoneal  Neuro: Alert, oriented, answers questions appropriately  Psych: Behavior appropriate      Plan Initiated in Triage:  Right lower extremity ultrasound    PIT Dispo:   Return to [facility] while awaiting workup and ED bed availability    Derik Ochoa DO on 11/9/2022 at 6:22 PM    Patient was evaluated by the Physician in Triage due to a limitation of available rooms in the Emergency Department. A plan of care was discussed based on the information obtained on the initial evaluation and patient was consuled to return back to the Emergency Department lob after this initial evalutaiton until results were obtained or a room became available in the Emergency Department. Patient was counseled not to leave prior to receiving the results of their workup.     Derik Ochoa DO  [facility] EMERGENCY DEPARTMENT  [address] [place] [id number]  [phone]       Derik Ochoa DO  11/09/22 6425

## 2023-11-05 ENCOUNTER — HEALTH MAINTENANCE LETTER (OUTPATIENT)
Age: 48
End: 2023-11-05

## 2024-12-22 ENCOUNTER — HEALTH MAINTENANCE LETTER (OUTPATIENT)
Age: 49
End: 2024-12-22

## 2025-03-14 ENCOUNTER — LAB REQUISITION (OUTPATIENT)
Dept: LAB | Facility: CLINIC | Age: 50
End: 2025-03-14
Payer: COMMERCIAL

## 2025-03-14 DIAGNOSIS — J32.4 CHRONIC PANSINUSITIS: ICD-10-CM

## 2025-03-14 PROCEDURE — 87186 SC STD MICRODIL/AGAR DIL: CPT | Mod: ORL | Performed by: OTOLARYNGOLOGY

## 2025-03-14 PROCEDURE — 87070 CULTURE OTHR SPECIMN AEROBIC: CPT | Mod: ORL | Performed by: OTOLARYNGOLOGY

## 2025-03-18 LAB
BACTERIA SPEC CULT: ABNORMAL